# Patient Record
Sex: FEMALE | Race: WHITE
[De-identification: names, ages, dates, MRNs, and addresses within clinical notes are randomized per-mention and may not be internally consistent; named-entity substitution may affect disease eponyms.]

---

## 2017-01-20 ENCOUNTER — HOSPITAL ENCOUNTER (OUTPATIENT)
Dept: HOSPITAL 62 - OD | Age: 82
End: 2017-01-20
Attending: NURSE PRACTITIONER
Payer: MEDICARE

## 2017-01-20 DIAGNOSIS — I10: Primary | ICD-10-CM

## 2017-01-20 DIAGNOSIS — E78.5: ICD-10-CM

## 2017-01-20 DIAGNOSIS — R53.83: ICD-10-CM

## 2017-01-20 DIAGNOSIS — Z79.899: ICD-10-CM

## 2017-01-20 DIAGNOSIS — E55.9: ICD-10-CM

## 2017-01-20 LAB
ALBUMIN SERPL-MCNC: 4.5 G/DL (ref 3.5–5)
ALP SERPL-CCNC: 86 U/L (ref 38–126)
ALT SERPL-CCNC: 26 U/L (ref 9–52)
ANION GAP SERPL CALC-SCNC: 13 MMOL/L (ref 5–19)
AST SERPL-CCNC: 19 U/L (ref 14–36)
BILIRUB DIRECT SERPL-MCNC: 0 MG/DL (ref 0–0.3)
BILIRUB SERPL-MCNC: 1 MG/DL (ref 0.2–1.3)
BUN SERPL-MCNC: 27 MG/DL (ref 7–20)
CALCIUM: 9.8 MG/DL (ref 8.4–10.2)
CHLORIDE SERPL-SCNC: 109 MMOL/L (ref 98–107)
CHOLEST SERPL-MCNC: 199.34 MG/DL (ref 0–200)
CO2 SERPL-SCNC: 23 MMOL/L (ref 22–30)
CREAT SERPL-MCNC: 0.99 MG/DL (ref 0.52–1.25)
DIRECT HDL: 58 MG/DL (ref 40–?)
ERYTHROCYTE [DISTWIDTH] IN BLOOD BY AUTOMATED COUNT: 13.2 % (ref 11.5–14)
GLUCOSE SERPL-MCNC: 88 MG/DL (ref 75–110)
HCT VFR BLD CALC: 38.7 % (ref 36–47)
HGB BLD-MCNC: 12.5 G/DL (ref 12–15.5)
HGB HCT DIFFERENCE: -1.2
LDLC SERPL DIRECT ASSAY-MCNC: 112 MG/DL (ref ?–100)
MAGNESIUM SERPL-MCNC: 1.7 MG/DL (ref 1.6–2.3)
MCH RBC QN AUTO: 35.2 PG (ref 27–33.4)
MCHC RBC AUTO-ENTMCNC: 32.3 G/DL (ref 32–36)
MCV RBC AUTO: 109 FL (ref 80–97)
POTASSIUM SERPL-SCNC: 4.5 MMOL/L (ref 3.6–5)
PROT SERPL-MCNC: 7 G/DL (ref 6.3–8.2)
RBC # BLD AUTO: 3.56 10^6/UL (ref 3.72–5.28)
SODIUM SERPL-SCNC: 144.8 MMOL/L (ref 137–145)
TRIGL SERPL-MCNC: 156 MG/DL (ref ?–150)
VLDLC SERPL CALC-MCNC: 31.2 MG/DL (ref 10–31)
WBC # BLD AUTO: 6.2 10^3/UL (ref 4–10.5)

## 2017-01-20 PROCEDURE — 82306 VITAMIN D 25 HYDROXY: CPT

## 2017-01-20 PROCEDURE — 84443 ASSAY THYROID STIM HORMONE: CPT

## 2017-01-20 PROCEDURE — 80053 COMPREHEN METABOLIC PANEL: CPT

## 2017-01-20 PROCEDURE — 36415 COLL VENOUS BLD VENIPUNCTURE: CPT

## 2017-01-20 PROCEDURE — 85027 COMPLETE CBC AUTOMATED: CPT

## 2017-01-20 PROCEDURE — 80061 LIPID PANEL: CPT

## 2017-01-20 PROCEDURE — 83735 ASSAY OF MAGNESIUM: CPT

## 2017-01-23 ENCOUNTER — HOSPITAL ENCOUNTER (OUTPATIENT)
Dept: HOSPITAL 62 - RAD | Age: 82
End: 2017-01-23
Attending: NURSE PRACTITIONER
Payer: MEDICARE

## 2017-01-23 DIAGNOSIS — Z91.81: ICD-10-CM

## 2017-01-23 DIAGNOSIS — R41.3: Primary | ICD-10-CM

## 2017-01-23 PROCEDURE — 70450 CT HEAD/BRAIN W/O DYE: CPT

## 2017-05-03 ENCOUNTER — HOSPITAL ENCOUNTER (OUTPATIENT)
Dept: HOSPITAL 62 - OD | Age: 82
End: 2017-05-03
Attending: NURSE PRACTITIONER
Payer: MEDICARE

## 2017-05-03 DIAGNOSIS — Z79.899: ICD-10-CM

## 2017-05-03 DIAGNOSIS — E78.2: ICD-10-CM

## 2017-05-03 DIAGNOSIS — E55.9: ICD-10-CM

## 2017-05-03 DIAGNOSIS — I10: Primary | ICD-10-CM

## 2017-05-03 PROCEDURE — 82306 VITAMIN D 25 HYDROXY: CPT

## 2017-05-03 PROCEDURE — 36415 COLL VENOUS BLD VENIPUNCTURE: CPT

## 2017-05-03 PROCEDURE — 80053 COMPREHEN METABOLIC PANEL: CPT

## 2017-05-04 LAB
ALBUMIN SERPL-MCNC: 4.4 G/DL (ref 3.5–5)
ALP SERPL-CCNC: 85 U/L (ref 38–126)
ALT SERPL-CCNC: 29 U/L (ref 9–52)
ANION GAP SERPL CALC-SCNC: 13 MMOL/L (ref 5–19)
AST SERPL-CCNC: 21 U/L (ref 14–36)
BILIRUB DIRECT SERPL-MCNC: 0.5 MG/DL (ref 0–0.4)
BILIRUB SERPL-MCNC: 1 MG/DL (ref 0.2–1.3)
BUN SERPL-MCNC: 24 MG/DL (ref 7–20)
CALCIUM: 9.8 MG/DL (ref 8.4–10.2)
CHLORIDE SERPL-SCNC: 108 MMOL/L (ref 98–107)
CO2 SERPL-SCNC: 23 MMOL/L (ref 22–30)
CREAT SERPL-MCNC: 1.05 MG/DL (ref 0.52–1.25)
GLUCOSE SERPL-MCNC: 115 MG/DL (ref 75–110)
POTASSIUM SERPL-SCNC: 4.6 MMOL/L (ref 3.6–5)
PROT SERPL-MCNC: 7.4 G/DL (ref 6.3–8.2)
SODIUM SERPL-SCNC: 144.1 MMOL/L (ref 137–145)

## 2017-06-01 ENCOUNTER — HOSPITAL ENCOUNTER (OUTPATIENT)
Dept: HOSPITAL 62 - RAD | Age: 82
End: 2017-06-01
Attending: NURSE PRACTITIONER
Payer: MEDICARE

## 2017-06-01 DIAGNOSIS — H53.9: ICD-10-CM

## 2017-06-01 DIAGNOSIS — R20.9: ICD-10-CM

## 2017-06-01 DIAGNOSIS — R42: ICD-10-CM

## 2017-06-01 DIAGNOSIS — R47.81: Primary | ICD-10-CM

## 2017-06-01 PROCEDURE — 70551 MRI BRAIN STEM W/O DYE: CPT

## 2017-06-02 NOTE — RADIOLOGY REPORT (SQ)
EXAM DESCRIPTION:  MRI HEAD WITHOUT



COMPLETED DATE/TIME:  6/1/2017 5:57 pm



REASON FOR STUDY:  DIZZINESS AND GIDDINESS R42  DIZZINESS AND GIDDINESS



COMPARISON:  None.



TECHNIQUE:  Multiplanar imaging includes non-contrasted T1, T2, FLAIR, and diffusion with ADC map seq
uences. Images stored on PACS.



LIMITATIONS:  None.



FINDINGS:  ANATOMY: No anomalies. Normal vascular flow voids. Pituitary fossa normal.

CSF SPACES: Normal in size and contour. No hemorrhage.

CEREBRUM: Sulci and gyri normal in size and contour.  Minimal spotty increased bifrontal and bipariet
al white matter signal on FLAIR imaging from small vessel disease, age-appropriate.  No evidence of h
emorrhage, mass, or extraaxial fluid collection.

POSTERIOR FOSSA: No signal alteration. No hemorrhage. No edema, masses or mass effect.  Internal tosha
tory canals, cerebello-pontine angles, mastoids normal.

DIFFUSION IMAGING: Negative for acute or sub-acute infarction.

ORBITS: No masses. Globes normal.

PARANASAL  SINUSES: No fluid levels.  Mucosa normal.

OTHER: No other significant finding.



IMPRESSION:  ESSENTIALLY NORMAL MRI OF THE BRAIN WITHOUT INTRAVENOUS GADOLINIUM CONTRAST.



TECHNICAL DOCUMENTATION:  JOB ID:  [id number]

 2011 [employer]- All Rights Reserved

## 2017-11-29 ENCOUNTER — HOSPITAL ENCOUNTER (EMERGENCY)
Dept: HOSPITAL 62 - ER | Age: 82
Discharge: HOME | End: 2017-11-29
Payer: MEDICARE

## 2017-11-29 VITALS — DIASTOLIC BLOOD PRESSURE: 83 MMHG | SYSTOLIC BLOOD PRESSURE: 122 MMHG

## 2017-11-29 DIAGNOSIS — I10: ICD-10-CM

## 2017-11-29 DIAGNOSIS — L03.114: ICD-10-CM

## 2017-11-29 DIAGNOSIS — Z88.4: ICD-10-CM
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
## 2017-11-29 DIAGNOSIS — J44.9: ICD-10-CM

## 2017-11-29 DIAGNOSIS — Z88.0: ICD-10-CM

## 2017-11-29 DIAGNOSIS — S50.319A: Primary | ICD-10-CM

## 2017-11-29 LAB
ALBUMIN SERPL-MCNC: 4.2 G/DL (ref 3.5–5)
ALP SERPL-CCNC: 102 U/L (ref 38–126)
ALT SERPL-CCNC: 35 U/L (ref 9–52)
ANION GAP SERPL CALC-SCNC: 15 MMOL/L (ref 5–19)
AST SERPL-CCNC: 18 U/L (ref 14–36)
BASOPHILS # BLD AUTO: 0.1 10^3/UL (ref 0–0.2)
BASOPHILS NFR BLD AUTO: 0.6 % (ref 0–2)
BILIRUB DIRECT SERPL-MCNC: 0.5 MG/DL (ref 0–0.4)
BILIRUB SERPL-MCNC: 1.1 MG/DL (ref 0.2–1.3)
BUN SERPL-MCNC: 23 MG/DL (ref 7–20)
CALCIUM: 9.7 MG/DL (ref 8.4–10.2)
CHLORIDE SERPL-SCNC: 103 MMOL/L (ref 98–107)
CO2 SERPL-SCNC: 24 MMOL/L (ref 22–30)
CREAT SERPL-MCNC: 0.97 MG/DL (ref 0.52–1.25)
EOSINOPHIL # BLD AUTO: 0.1 10^3/UL (ref 0–0.6)
EOSINOPHIL NFR BLD AUTO: 1 % (ref 0–6)
ERYTHROCYTE [DISTWIDTH] IN BLOOD BY AUTOMATED COUNT: 13.6 % (ref 11.5–14)
GLUCOSE SERPL-MCNC: 114 MG/DL (ref 75–110)
HCT VFR BLD CALC: 38 % (ref 36–47)
HGB BLD-MCNC: 13.3 G/DL (ref 12–15.5)
HGB HCT DIFFERENCE: 1.9
LYMPHOCYTES # BLD AUTO: 2 10^3/UL (ref 0.5–4.7)
LYMPHOCYTES NFR BLD AUTO: 15.1 % (ref 13–45)
MCH RBC QN AUTO: 38 PG (ref 27–33.4)
MCHC RBC AUTO-ENTMCNC: 35.1 G/DL (ref 32–36)
MCV RBC AUTO: 108 FL (ref 80–97)
MONOCYTES # BLD AUTO: 0.7 10^3/UL (ref 0.1–1.4)
MONOCYTES NFR BLD AUTO: 5.6 % (ref 3–13)
NEUTROPHILS # BLD AUTO: 10.1 10^3/UL (ref 1.7–8.2)
NEUTS SEG NFR BLD AUTO: 77.7 % (ref 42–78)
POTASSIUM SERPL-SCNC: 4.2 MMOL/L (ref 3.6–5)
PROT SERPL-MCNC: 6.9 G/DL (ref 6.3–8.2)
RBC # BLD AUTO: 3.5 10^6/UL (ref 3.72–5.28)
SODIUM SERPL-SCNC: 141.5 MMOL/L (ref 137–145)
WBC # BLD AUTO: 13 10^3/UL (ref 4–10.5)

## 2017-11-29 PROCEDURE — 73080 X-RAY EXAM OF ELBOW: CPT

## 2017-11-29 PROCEDURE — 99283 EMERGENCY DEPT VISIT LOW MDM: CPT

## 2017-11-29 PROCEDURE — 36415 COLL VENOUS BLD VENIPUNCTURE: CPT

## 2017-11-29 PROCEDURE — 85025 COMPLETE CBC W/AUTO DIFF WBC: CPT

## 2017-11-29 PROCEDURE — 80053 COMPREHEN METABOLIC PANEL: CPT

## 2017-11-29 PROCEDURE — 87040 BLOOD CULTURE FOR BACTERIA: CPT

## 2017-11-29 NOTE — RADIOLOGY REPORT (SQ)
EXAM DESCRIPTION:  ELBOW LEFT OVER 2 VIEWS



COMPLETED DATE/TIME:  11/29/2017 4:25 pm



REASON FOR STUDY:  elbow infection



COMPARISON:  None.



NUMBER OF VIEWS:  Four views.



TECHNIQUE:  AP, lateral, and both oblique radiographic images acquired of the left elbow.



LIMITATIONS:  None.



FINDINGS:  MINERALIZATION: Normal.

BONES: No acute fracture or dislocation.  There is no plain film evidence for bony involvement by ost
eomyelitis.

JOINT: Periarticular calcific densities are identified which may be within the joint capsule.  The po
ssibility of synovial osteochondromatosis should be considered.  In the lateral projection there appe
ars to be subluxation of the radial head in relation to the capitellum.

SOFT TISSUES: No foreign body is identified.  No subcutaneous gas collections are identified.

OTHER: No other significant finding.



IMPRESSION:  No acute fracture or dislocation.  No plain film evidence for bony involvement by osteom
yelitis.  Other findings as noted above



TECHNICAL DOCUMENTATION:  JOB ID:  [id number]

 2011 [employer]- All Rights Reserved

## 2017-11-29 NOTE — ER DOCUMENT REPORT
ED General





- General


Chief Complaint: Skin Problem


Stated Complaint: SKIN PROBLEM


Time Seen by Provider: 11/29/17 15:55


Mode of Arrival: Ambulatory


Notes: 





Patient is an 83-year-old female who presents with concerns of infection over 

her left elbow.  She was seen in pain management today for consideration of a 

local hip injection but after they noted the swelling and erythema around her 

right elbow they sent her to the emergency department for further evaluation.  

Patient states that she believes that this area become red and irritated as she 

has been pushing herself up in her bed using her elbows.  She denies any 

significant pain to the area.  She also denies any difficulty with range of 

motion of the left elbow.  No pain with range of motion of the elbow.  She has 

not had any fever or constitutional symptoms.  She does note that she is noted 

spreading redness to the area over the last several days.  She denies any 

history of similar symptoms in the past.


TRAVEL OUTSIDE OF THE [place] IN LAST 30 DAYS: No





- Related Data


Allergies/Adverse Reactions: 


 





Penicillins Allergy (Verified 02/05/15 10:26)


 


procaine HCl [From Novocain] Allergy (Verified 02/05/15 10:26)


 











Past Medical History





- General


Information source: Patient





- Social History


Smoking Status: Never Smoker


Chew tobacco use (# tins/day): No


Frequency of alcohol use: Occasional


Drug Abuse: None


Lives with: Family


Family History: Reviewed & Not Pertinent


Patient has suicidal ideation: No


Patient has homicidal ideation: No





- Past Medical History


Cardiac Medical History: Reports: Hx Hypercholesterolemia, Hx Hypertension


Pulmonary Medical History: Reports: Hx Bronchitis, Hx COPD


Neurological Medical History: Denies: Hx Seizures


Renal/ Medical History: Denies: Hx Peritoneal Dialysis


Musculoskeltal Medical History: Reports Hx Arthritis


Past Surgical History: Reports: Hx Appendectomy, Hx Cholecystectomy, Hx 

Hysterectomy, Hx Orthopedic Surgery - Back surgery, foot surgery, 2 fingers 

reattached., Hx Tonsillectomy





- Immunizations


Hx Diphtheria, Pertussis, Tetanus Vaccination: Yes





Review of Systems





- Review of Systems


Notes: 





Constitutional: Negative for fever.


HENT: Negative for sore throat.


Eyes: Negative for visual changes.


Cardiovascular: Negative for chest pain.


Respiratory: Negative for shortness of breath.


Gastrointestinal: Negative for abdominal pain, vomiting or diarrhea.


Genitourinary: Negative for dysuria.


Musculoskeletal:positive for left elbow swelling


Skin: Positive for rash.


Neurological: Negative for headaches, weakness or numbness.





10 point ROS negative except as marked above and in HPI.





Physical Exam





- Vital signs


Vitals: 


 











Temp Pulse Resp BP Pulse Ox


 


 98.3 F   66   20   152/88 H  98 


 


 11/29/17 15:17  11/29/17 15:17  11/29/17 15:17  11/29/17 15:17  11/29/17 15:17











Interpretation: Hypertensive


Notes: 





PHYSICAL EXAMINATION:





GENERAL: Well-appearing, well-nourished and in no acute distress.





HEAD: Atraumatic, normocephalic.





EYES: Pupils equal round and reactive to light, extraocular movements intact, 

sclera anicteric, conjunctiva are normal.





ENT: nares patent, oropharynx clear without exudates.  Moist mucous membranes.





NECK: Normal range of motion, supple without lymphadenopathy





LUNGS: Breath sounds clear to auscultation bilaterally and equal.  No wheezes 

rales or rhonchi.





HEART: Regular rate and rhythm without murmurs





ABDOMEN: Soft, nontender, normoactive bowel sounds.  No guarding, no rebound.  

No masses appreciated.





EXTREMITIES: Normal range of motion, with full flexion at the left elbow.  No 

pain with range of motion.  There is mild swelling over the olecranon.





NEUROLOGICAL: No focal neurological deficits. Moves all extremities 

spontaneously and on command.





PSYCH: Normal mood, normal affect.





SKIN: Warm, Dry, normal turgor, there is an area of erythema extending from a 

superficial abrasion at the level of the olecranon approximately over a 3 x 4 

cm area surrounding the elbow and asked up to the forearm





Course





- Re-evaluation


Re-evalutation: 





11/29/17 19:01


Patient presents with a 3 x 4 cm area of cellulitis starting from the olecranon 

of the left elbow extending over the forearm.  It appears the patient has had a 

minor skin avulsion to the area likely secondary to applying direct pressure to 

the area when she is trying to adjust herself in her bed.  Patient does not 

meet sepsis criteria.  Her vitals are within normal limits at time of 

presentation.  She has a mild white count at 13 which is not clinically 

surprising given the area of cellulitis.  I do not all suspect a septic joint 

as patient has full range of motion with the elbow without any discomfort 

whatsoever.  She actually helps proper self up in bed when I walked into the 

room using her forearm and elbow.  She is laughing and joking with me during 

exam and does not appear clinically ill.  Will be started on coverage for 

cellulitis using cephalexin.  A dose has been given here in the emergency 

department.  At this time will discharge with return precautions and follow-up 

recommendations.  Verbal discharge instructions given a the bedside and 

opportunity for questions given. Medication warnings reviewed. Patient is in 

agreement with this plan and has verbalized understanding of return precautions 

and the need for primary care follow-up in the next 24-72 hours.





- Vital Signs


Vital signs: 


 











Temp Pulse Resp BP Pulse Ox


 


 97.9 F   68   20   122/83   96 


 


 11/29/17 19:13  11/29/17 19:13  11/29/17 15:19  11/29/17 19:13  11/29/17 19:13














- Laboratory


Result Diagrams: 


 11/29/17 16:13





 11/29/17 16:13


Laboratory results interpreted by me: 


 











  11/29/17 11/29/17





  16:13 16:13


 


WBC  13.0 H 


 


RBC  3.50 L 


 


MCV  108 H 


 


MCH  38.0 H 


 


Absolute Neutrophils  10.1 H 


 


BUN   23 H


 


Est GFR (Non-Af Amer)   55 L


 


Glucose   114 H


 


Direct Bilirubin   0.5 H














- Diagnostic Test


Radiology reviewed: Image reviewed, Reports reviewed


Radiology results interpreted by me: 





11/30/17 04:08


Left elbow: No acute fracture





Discharge





- Discharge


Clinical Impression: 


 Cellulitis of arm, left





Condition: Good


Disposition: HOME, SELF-CARE


Additional Instructions: 


The rash is likely due to infection of your skin.  You need to take the 

antibiotics as prescribed.  Do not stop even if the rash goes away until you 

have completed all the antibiotics.  The area of redness was traced out here in 

the emergency department with a marking pen.  You need to return to emergency 

department if the redness spreads outside of this area by more than 2 cm in any 

direction.  You should also return if you develop fevers with temperature 

greater than 101, persistent vomiting, worsening pain, or have any other 

symptoms that are concerning to you.


Prescriptions: 


Cephalexin Monohydrate [Keflex 500 mg Capsule] 500 mg PO Q6H 7 Days  capsule

## 2017-11-29 NOTE — ER DOCUMENT REPORT
ED Medical Screen (RME)





- General


Chief Complaint: Skin Problem


Stated Complaint: SKIN PROBLEM


Time Seen by Provider: 11/29/17 15:55


Mode of Arrival: Ambulatory


Information source: Patient


Notes: 





Patient states she was supposed to have an epidural this afternoon for chronic 

back pain and whenever Dr. Mitchell noticed her elbow he wanted her sent here 

for evaluation of a septic elbow joint.  Patient states that she has been using 

her arms more to help move about because of her back pain.  Patient denies any 

fever.  Patient denies any history of diabetes although review of patient's 

medication list and problem list does demonstrate she has a history of diabetes.


TRAVEL OUTSIDE OF THE [place] IN LAST 30 DAYS: No





- Related Data


Allergies/Adverse Reactions: 


 





Penicillins Allergy (Verified 02/05/15 10:26)


 


procaine HCl [From Novocain] Allergy (Verified 02/05/15 10:26)


 











Past Medical History





- Past Medical History


Cardiac Medical History: Reports: Hx Hypercholesterolemia, Hx Hypertension


Pulmonary Medical History: Reports: Hx Bronchitis, Hx COPD


Neurological Medical History: Denies: Hx Seizures


Musculoskeltal Medical History: Reports Hx Arthritis


Past Surgical History: Reports: Hx Appendectomy, Hx Cholecystectomy, Hx 

Hysterectomy, Hx Orthopedic Surgery - Back surgery, foot surgery, 2 fingers 

reattached., Hx Tonsillectomy





- Immunizations


Hx Diphtheria, Pertussis, Tetanus Vaccination: Yes





Physical Exam





- Vital signs


Vitals: 





 











Temp Pulse Resp BP Pulse Ox


 


 98.3 F   66   20   152/88 H  98 


 


 11/29/17 15:17  11/29/17 15:17  11/29/17 15:17  11/29/17 15:17  11/29/17 15:17














- Extremities


General upper extremity: Tender - Left elbow tenderness with overlying erythema 

that extends down her left forearm.  Patient with pustular skin lesion 

overlying olecranon process





Course





- Vital Signs


Vital signs: 





 











Temp Pulse Resp BP Pulse Ox


 


 98.3 F   66   20   152/88 H  98 


 


 11/29/17 15:17  11/29/17 15:17  11/29/17 15:17  11/29/17 15:17  11/29/17 15:17

## 2018-01-27 ENCOUNTER — HOSPITAL ENCOUNTER (OUTPATIENT)
Dept: HOSPITAL 62 - RAD | Age: 83
End: 2018-01-27
Attending: PHYSICIAN ASSISTANT
Payer: MEDICARE

## 2018-01-27 DIAGNOSIS — M25.552: Primary | ICD-10-CM

## 2018-01-27 NOTE — RADIOLOGY REPORT (SQ)
EXAM DESCRIPTION:  MRILLJ WO



COMPLETED DATE/TIME:  1/27/2018 11:31 am



REASON FOR STUDY:  PAIN IN LEFT HIP



COMPARISON:  None.



TECHNIQUE:  Noncontrast multiplanar MR imaging.  Sequences include wide field of view pelvis and focu
sed hip of interest.  Fat sensitive, water sensitive, and cartilage sensitive sequences.

Specific hip of interest: Left



LIMITATIONS:  None.



FINDINGS:  MARROW SIGNAL: No evidence of replacement or occult fracture.  No suspicious lesion apprec
iated.

SPECIFIC HIP OF INTEREST:  No joint effusion.  No evidence of significant subchondral cyst formation 
or bulky osteophytes.  Symmetric hip joint space, relatively preserved.  No evidence of AVN.  Mild fl
uid along the gluteus minimus and medius trochanteric insertions.  Generalized gluteal atrophy.  Gene
rally symmetric appearance, also suggested on the right.

OPPOSITE HIP: Similar changes to the left hip.  No large effusion or joint space significant loss.  M
ild fluid along the gluteal insertions.

REMAINDER OF THE OSSEOUS PELVIS: SI joints normal.  Symphasis pubis intact.  No Avulsion injury evide
nt.

INTRA- AND EXTRAPELVIC SOFT TISSUES: No intrapelvic mass or free fluid. Bladder normal.  No extrapelv
ic mass.  No inguinal hernia or adenopathy.



IMPRESSION:  Findings which may relate to greater trochanteric pain syndrome bilaterally.  No evidenc
e of hip fracture, bone lesion.  Relative maintenance of hip joint spaces.